# Patient Record
Sex: MALE | Race: WHITE
[De-identification: names, ages, dates, MRNs, and addresses within clinical notes are randomized per-mention and may not be internally consistent; named-entity substitution may affect disease eponyms.]

---

## 2017-08-17 ENCOUNTER — APPOINTMENT (OUTPATIENT)
Dept: OTOLARYNGOLOGY | Facility: CLINIC | Age: 32
End: 2017-08-17
Payer: COMMERCIAL

## 2017-08-17 VITALS — HEIGHT: 71 IN | BODY MASS INDEX: 29.12 KG/M2 | WEIGHT: 208 LBS

## 2017-08-17 DIAGNOSIS — Z86.2 PERSONAL HISTORY OF DISEASES OF THE BLOOD AND BLOOD-FORMING ORGANS AND CERTAIN DISORDERS INVOLVING THE IMMUNE MECHANISM: ICD-10-CM

## 2017-08-17 DIAGNOSIS — R04.0 EPISTAXIS: ICD-10-CM

## 2017-08-17 PROCEDURE — 99203 OFFICE O/P NEW LOW 30 MIN: CPT | Mod: 25

## 2017-08-17 PROCEDURE — 31231 NASAL ENDOSCOPY DX: CPT

## 2017-08-23 PROBLEM — R04.0 BLEEDING NOSE: Status: ACTIVE | Noted: 2017-08-17

## 2017-08-23 PROBLEM — Z86.2 HISTORY OF BLEEDING DISORDER: Status: RESOLVED | Noted: 2017-08-17 | Resolved: 2017-08-23

## 2017-08-23 RX ORDER — MUPIROCIN 2 G/100G
2 CREAM TOPICAL
Qty: 15 | Refills: 0 | Status: ACTIVE | COMMUNITY
Start: 2017-06-06

## 2017-08-23 RX ORDER — CLINDAMYCIN AND BENZOYL PEROXIDE 50; 10 MG/G; MG/G
1-5 GEL TOPICAL
Qty: 50 | Refills: 0 | Status: ACTIVE | COMMUNITY
Start: 2017-02-23

## 2017-08-23 RX ORDER — MONTELUKAST 10 MG/1
10 TABLET, FILM COATED ORAL
Refills: 0 | Status: ACTIVE | COMMUNITY

## 2017-08-23 RX ORDER — DOXYCYCLINE HYCLATE 100 MG/1
100 CAPSULE ORAL
Qty: 14 | Refills: 0 | Status: ACTIVE | COMMUNITY
Start: 2017-02-23

## 2017-08-24 ENCOUNTER — APPOINTMENT (OUTPATIENT)
Dept: OTOLARYNGOLOGY | Facility: CLINIC | Age: 32
End: 2017-08-24
Payer: COMMERCIAL

## 2017-08-24 DIAGNOSIS — L03.90 CELLULITIS, UNSPECIFIED: ICD-10-CM

## 2017-08-24 PROCEDURE — 99214 OFFICE O/P EST MOD 30 MIN: CPT | Mod: 25

## 2017-08-24 PROCEDURE — 30903 CONTROL OF NOSEBLEED: CPT

## 2017-08-24 RX ORDER — MUPIROCIN 20 MG/G
2 OINTMENT TOPICAL 3 TIMES DAILY
Qty: 1 | Refills: 4 | Status: ACTIVE | COMMUNITY
Start: 2017-08-24 | End: 1900-01-01

## 2017-09-08 ENCOUNTER — APPOINTMENT (OUTPATIENT)
Dept: OTOLARYNGOLOGY | Facility: CLINIC | Age: 32
End: 2017-09-08
Payer: COMMERCIAL

## 2017-09-08 VITALS — DIASTOLIC BLOOD PRESSURE: 82 MMHG | SYSTOLIC BLOOD PRESSURE: 128 MMHG | HEART RATE: 59 BPM | OXYGEN SATURATION: 98 %

## 2017-09-08 PROCEDURE — 99213 OFFICE O/P EST LOW 20 MIN: CPT

## 2018-01-25 ENCOUNTER — APPOINTMENT (OUTPATIENT)
Dept: OTOLARYNGOLOGY | Facility: CLINIC | Age: 33
End: 2018-01-25
Payer: COMMERCIAL

## 2018-01-25 VITALS
OXYGEN SATURATION: 96 % | DIASTOLIC BLOOD PRESSURE: 69 MMHG | SYSTOLIC BLOOD PRESSURE: 118 MMHG | HEART RATE: 51 BPM | TEMPERATURE: 97.6 F

## 2018-01-25 PROCEDURE — 30903 CONTROL OF NOSEBLEED: CPT | Mod: LT

## 2018-01-25 PROCEDURE — 99214 OFFICE O/P EST MOD 30 MIN: CPT | Mod: 25

## 2018-01-25 RX ORDER — SULFAMETHOXAZOLE AND TRIMETHOPRIM 800; 160 MG/1; MG/1
800-160 TABLET ORAL TWICE DAILY
Qty: 14 | Refills: 0 | Status: COMPLETED | COMMUNITY
Start: 2017-08-24 | End: 2018-01-25

## 2018-06-27 ENCOUNTER — APPOINTMENT (OUTPATIENT)
Dept: OTOLARYNGOLOGY | Facility: CLINIC | Age: 33
End: 2018-06-27
Payer: COMMERCIAL

## 2018-06-27 VITALS
HEART RATE: 58 BPM | OXYGEN SATURATION: 96 % | SYSTOLIC BLOOD PRESSURE: 118 MMHG | TEMPERATURE: 97.6 F | DIASTOLIC BLOOD PRESSURE: 83 MMHG

## 2018-06-27 PROCEDURE — 99213 OFFICE O/P EST LOW 20 MIN: CPT | Mod: 25

## 2018-06-27 PROCEDURE — 96372 THER/PROPH/DIAG INJ SC/IM: CPT

## 2019-05-20 ENCOUNTER — APPOINTMENT (OUTPATIENT)
Dept: OTOLARYNGOLOGY | Facility: CLINIC | Age: 34
End: 2019-05-20
Payer: COMMERCIAL

## 2019-05-20 VITALS
HEART RATE: 54 BPM | DIASTOLIC BLOOD PRESSURE: 76 MMHG | OXYGEN SATURATION: 96 % | TEMPERATURE: 98 F | SYSTOLIC BLOOD PRESSURE: 138 MMHG

## 2019-05-20 DIAGNOSIS — R04.0 EPISTAXIS: ICD-10-CM

## 2019-05-20 DIAGNOSIS — J30.2 OTHER SEASONAL ALLERGIC RHINITIS: ICD-10-CM

## 2019-05-20 PROCEDURE — 99213 OFFICE O/P EST LOW 20 MIN: CPT | Mod: 25

## 2019-05-20 RX ORDER — METHYLPRED ACET/NACL,ISO-OS/PF 40 MG/ML
40 VIAL (ML) INJECTION
Qty: 1 | Refills: 0 | Status: COMPLETED | OUTPATIENT
Start: 2019-05-20

## 2019-05-20 RX ADMIN — METHYLPREDNISOLONE ACETATE MG/ML: 40 INJECTION, SUSPENSION INTRA-ARTICULAR; INTRALESIONAL; INTRAMUSCULAR; SOFT TISSUE at 00:00

## 2019-05-20 NOTE — REVIEW OF SYSTEMS
[Patient Intake Form Reviewed] : Patient intake form was reviewed [As Noted in HPI] : as noted in HPI [Nasal Congestion] : nasal congestion [Negative] : Heme/Lymph [Nose Bleeds] : no nose bleeds

## 2019-05-20 NOTE — PROCEDURE
[Epistaxis] : evaluation of epistaxis [FreeTextEntry1] : Left Dilated nasal Blood vessels for laser ablation [Cauterized w/Silver Nitrate] : the bleeding was not cauterized with silver nitrate [FreeTextEntry2] : Laser Assisted Control of Epistaxis

## 2019-05-20 NOTE — REASON FOR VISIT
[Subsequent Evaluation] : a subsequent evaluation for [Epistaxis] : epistaxis [FreeTextEntry2] : status post  laser control of epistaxis.

## 2019-05-20 NOTE — HISTORY OF PRESENT ILLNESS
[de-identified] : 32 years old male patient with history of status post  laser control of epistaxis.  Patient is present today in the office with multiple  left dilated blood vessels.  No active bleeding.  Allergic Rhinitis

## 2020-03-12 ENCOUNTER — APPOINTMENT (OUTPATIENT)
Dept: OTOLARYNGOLOGY | Facility: CLINIC | Age: 35
End: 2020-03-12
Payer: COMMERCIAL

## 2020-03-12 VITALS
OXYGEN SATURATION: 95 % | TEMPERATURE: 98.5 F | HEART RATE: 68 BPM | DIASTOLIC BLOOD PRESSURE: 83 MMHG | SYSTOLIC BLOOD PRESSURE: 147 MMHG

## 2020-03-12 DIAGNOSIS — R42 DIZZINESS AND GIDDINESS: ICD-10-CM

## 2020-03-12 DIAGNOSIS — M26.609 UNSPECIFIED TEMPOROMANDIBULAR JOINT DISORDER: ICD-10-CM

## 2020-03-12 PROCEDURE — 31231 NASAL ENDOSCOPY DX: CPT

## 2020-03-12 PROCEDURE — 99213 OFFICE O/P EST LOW 20 MIN: CPT | Mod: 25

## 2020-03-12 NOTE — PROCEDURE
[Cauterized w/Silver Nitrate] : the bleeding was not cauterized with silver nitrate [FreeTextEntry6] : ok [FreeTextEntry2] : Laser Assisted Control of Epistaxis

## 2020-03-12 NOTE — REASON FOR VISIT
[Subsequent Evaluation] : a subsequent evaluation for [Epistaxis] : epistaxis [FreeTextEntry2] : status post  laser control of epistaxis.   Allergic Rhinitis, Sinus Pressure, Dizziness & Nausea for one week

## 2020-03-12 NOTE — REVIEW OF SYSTEMS
[Patient Intake Form Reviewed] : Patient intake form was reviewed [Dizziness] : dizziness [Nasal Congestion] : nasal congestion [As Noted in HPI] : as noted in HPI [Negative] : Heme/Lymph [Nose Bleeds] : no nose bleeds [FreeTextEntry7] : Nausea

## 2020-03-12 NOTE — HISTORY OF PRESENT ILLNESS
[de-identified] : 32 years old male patient with history Allergic Rhinitis, Sinus Pressure, Dizziness & Nausea for one week.   Patient is present today in the office with Allergic Rhinitis. Bilateral   TMJ

## 2021-02-10 ENCOUNTER — NON-APPOINTMENT (OUTPATIENT)
Age: 36
End: 2021-02-10

## 2021-02-17 ENCOUNTER — APPOINTMENT (OUTPATIENT)
Dept: OTOLARYNGOLOGY | Facility: CLINIC | Age: 36
End: 2021-02-17
Payer: COMMERCIAL

## 2021-02-17 VITALS
HEART RATE: 57 BPM | TEMPERATURE: 97.6 F | HEIGHT: 71 IN | OXYGEN SATURATION: 97 % | WEIGHT: 215 LBS | BODY MASS INDEX: 30.1 KG/M2 | DIASTOLIC BLOOD PRESSURE: 82 MMHG | SYSTOLIC BLOOD PRESSURE: 126 MMHG

## 2021-02-17 PROCEDURE — 99072 ADDL SUPL MATRL&STAF TM PHE: CPT

## 2021-02-17 PROCEDURE — 31231 NASAL ENDOSCOPY DX: CPT

## 2021-02-17 PROCEDURE — 99212 OFFICE O/P EST SF 10 MIN: CPT | Mod: 25

## 2021-02-17 NOTE — HISTORY OF PRESENT ILLNESS
[de-identified] : 35 years old male patient with  Left Recurrent Epistaxis for the past month   Patient is present today in the office with No active Epistaxis.   Bacitracin was inserted into patient left nostril for lubrication.

## 2021-02-17 NOTE — REASON FOR VISIT
[Subsequent Evaluation] : a subsequent evaluation for [Epistaxis] : epistaxis [FreeTextEntry2] : status post  laser control of epistaxis.   Recurrent Epistaxis for the past month

## 2021-02-17 NOTE — PROCEDURE
[Image(s) Captured] : image(s) captured and filed [Topical Lidocaine] : topical lidocaine [Epistaxis] : evaluation of epistaxis [Rigid Endoscope] : examined with a rigid endoscope [Serial Number: ___] : Serial Number: [unfilled] [Nasal Septum Mucosa Bleeding] : no bleeding [Cauterized w/Silver Nitrate] : the bleeding was not cauterized with silver nitrate [FreeTextEntry6] : left epistaxis site not visible

## 2022-05-02 ENCOUNTER — NON-APPOINTMENT (OUTPATIENT)
Age: 37
End: 2022-05-02

## 2022-05-04 ENCOUNTER — APPOINTMENT (OUTPATIENT)
Dept: OTOLARYNGOLOGY | Facility: CLINIC | Age: 37
End: 2022-05-04
Payer: COMMERCIAL

## 2022-05-04 VITALS
WEIGHT: 220 LBS | DIASTOLIC BLOOD PRESSURE: 85 MMHG | RESPIRATION RATE: 17 BRPM | HEIGHT: 71 IN | BODY MASS INDEX: 30.8 KG/M2 | HEART RATE: 74 BPM | OXYGEN SATURATION: 99 % | TEMPERATURE: 97.9 F | SYSTOLIC BLOOD PRESSURE: 138 MMHG

## 2022-05-04 DIAGNOSIS — R04.0 EPISTAXIS: ICD-10-CM

## 2022-05-04 DIAGNOSIS — J34.2 DEVIATED NASAL SEPTUM: ICD-10-CM

## 2022-05-04 PROCEDURE — 30903 CONTROL OF NOSEBLEED: CPT | Mod: LT

## 2022-05-04 PROCEDURE — 99214 OFFICE O/P EST MOD 30 MIN: CPT | Mod: 25

## 2022-05-04 NOTE — PROCEDURE
[Image(s) Captured] : image(s) captured and filed [Epistaxis] : evaluation of epistaxis [Topical Lidocaine] : topical lidocaine [Oxymetazoline HCl] : oxymetazoline HCl [Rigid Endoscope] : examined with a rigid endoscope [Serial Number: ___] : Serial Number: [unfilled] [Nasal Septum Mucosa Bleeding] : no bleeding [Congested] : congested [Allergic] : allergic signs [Cauterized w/Silver Nitrate] : the bleeding was cauterized with silver nitrate [FreeTextEntry1] : Left side Laser Assisted Control of epistaxis ( Diode Laser ) [FreeTextEntry3] : Left side Laser Assisted Control of epistaxis ( Diode Laser ) [FreeTextEntry6] : left epistaxis site not visible [FreeTextEntry2] : Left Laser Assisted Control of Epistaxis ( Diode Laser )

## 2022-05-04 NOTE — REVIEW OF SYSTEMS
[Patient Intake Form Reviewed] : Patient intake form was reviewed [Nasal Congestion] : nasal congestion [Nose Bleeds] : nose bleeds [As Noted in HPI] : as noted in HPI [Negative] : Heme/Lymph [Dizziness] : no dizziness [FreeTextEntry7] : Nausea

## 2022-05-04 NOTE — HISTORY OF PRESENT ILLNESS
[de-identified] : 36 years old male patient with  Left Recurrent Epistaxis for the past month   Patient is present today in the office with No active left side Epistaxis. Left side dilated nasal blood vessels.  Bacitracin was inserted into patient left nostril for lubrication.  Depo -Medrol  40 mg/ml 0.5 cc was administer to left deltoid.  NDC 687766047-70,   EXP.  03/2023, Lot# [id number]

## 2022-05-04 NOTE — REASON FOR VISIT
[Subsequent Evaluation] : a subsequent evaluation for [Epistaxis] : epistaxis [FreeTextEntry2] : status post  laser control of epistaxis.   Recurrent  Left side Epistaxis for the past month

## 2022-05-10 NOTE — HISTORY OF PRESENT ILLNESS
[de-identified] : 36 years old male patient with  Left Recurrent Epistaxis for the past month   Patient is present today in the office with No active left side Epistaxis.   Bacitracin was inserted into patient left nostril for lubrication.

## 2022-05-10 NOTE — PROCEDURE
[Image(s) Captured] : image(s) captured and filed [Epistaxis] : evaluation of epistaxis [Topical Lidocaine] : topical lidocaine [Oxymetazoline HCl] : oxymetazoline HCl [Rigid Endoscope] : examined with a rigid endoscope [Serial Number: ___] : Serial Number: [unfilled] [Nasal Septum Mucosa Bleeding] : no bleeding [Cauterized w/Silver Nitrate] : the bleeding was not cauterized with silver nitrate [FreeTextEntry6] : left epistaxis site not visible

## 2023-01-10 ENCOUNTER — APPOINTMENT (OUTPATIENT)
Dept: INTERNAL MEDICINE | Facility: CLINIC | Age: 38
End: 2023-01-10
Payer: COMMERCIAL

## 2023-01-10 PROCEDURE — 99395 PREV VISIT EST AGE 18-39: CPT

## 2023-01-10 PROCEDURE — 36415 COLL VENOUS BLD VENIPUNCTURE: CPT

## 2023-03-31 ENCOUNTER — APPOINTMENT (OUTPATIENT)
Dept: INTERNAL MEDICINE | Facility: CLINIC | Age: 38
End: 2023-03-31
Payer: COMMERCIAL

## 2023-03-31 PROCEDURE — 99213 OFFICE O/P EST LOW 20 MIN: CPT

## 2023-04-10 ENCOUNTER — APPOINTMENT (OUTPATIENT)
Dept: OTOLARYNGOLOGY | Facility: CLINIC | Age: 38
End: 2023-04-10
Payer: COMMERCIAL

## 2023-04-10 VITALS
HEIGHT: 71 IN | RESPIRATION RATE: 16 BRPM | OXYGEN SATURATION: 98 % | WEIGHT: 205 LBS | SYSTOLIC BLOOD PRESSURE: 132 MMHG | BODY MASS INDEX: 28.7 KG/M2 | DIASTOLIC BLOOD PRESSURE: 91 MMHG | HEART RATE: 60 BPM

## 2023-04-10 PROCEDURE — 99213 OFFICE O/P EST LOW 20 MIN: CPT

## 2023-05-08 ENCOUNTER — APPOINTMENT (OUTPATIENT)
Dept: OTOLARYNGOLOGY | Facility: CLINIC | Age: 38
End: 2023-05-08
Payer: COMMERCIAL

## 2023-05-08 VITALS
OXYGEN SATURATION: 98 % | HEIGHT: 71 IN | RESPIRATION RATE: 16 BRPM | BODY MASS INDEX: 28.7 KG/M2 | WEIGHT: 205 LBS | DIASTOLIC BLOOD PRESSURE: 88 MMHG | SYSTOLIC BLOOD PRESSURE: 120 MMHG | HEART RATE: 68 BPM

## 2023-05-08 DIAGNOSIS — H57.89 OTHER SPECIFIED DISORDERS OF EYE AND ADNEXA: ICD-10-CM

## 2023-05-08 DIAGNOSIS — Z87.09 PERSONAL HISTORY OF OTHER DISEASES OF THE RESPIRATORY SYSTEM: ICD-10-CM

## 2023-05-08 DIAGNOSIS — J34.3 HYPERTROPHY OF NASAL TURBINATES: ICD-10-CM

## 2023-05-08 DIAGNOSIS — H10.9 UNSPECIFIED CONJUNCTIVITIS: ICD-10-CM

## 2023-05-08 DIAGNOSIS — J30.2 OTHER SEASONAL ALLERGIC RHINITIS: ICD-10-CM

## 2023-05-08 DIAGNOSIS — J30.9 ALLERGIC RHINITIS, UNSPECIFIED: ICD-10-CM

## 2023-05-08 PROCEDURE — 99213 OFFICE O/P EST LOW 20 MIN: CPT

## 2023-05-08 RX ORDER — TRIFLURIDINE 10 MG/ML
1 SOLUTION OPHTHALMIC
Qty: 1 | Refills: 3 | Status: ACTIVE | COMMUNITY
Start: 2023-05-08 | End: 1900-01-01

## 2023-05-09 NOTE — PROCEDURE
[Serial Number: ___] : Serial Number: [unfilled] [Congested] : congested [Allergic] : allergic signs [Nasal Septum Mucosa Bleeding] : no bleeding [Red] : red [Cauterized w/Silver Nitrate] : the bleeding was not cauterized with silver nitrate

## 2023-05-09 NOTE — HISTORY OF PRESENT ILLNESS
[de-identified] : 37 years old male patient with  Left Recurrent Epistaxis for the past month   Patient is present today in the office with  Bilateral red eyes.  Seasonal allergies for the past couple of days.  Right eye conjunctivitis.  Allergic Rhinitis, Turbinate Hypertrophy.  Depo -Medrol  40 mg/ml 0.5 cc was administer to left deltoid.  NDC 517825116-83,   EXP.  03/2025, Lot# [id number]

## 2023-05-09 NOTE — REASON FOR VISIT
[Subsequent Evaluation] : a subsequent evaluation for [Epistaxis] : epistaxis [FreeTextEntry2] : status post  laser control of epistaxis  Left side Epistaxis.   Nasal congestion.  Bilateral red eyes.  Seasonal allergies for the past couple of days,

## 2023-07-25 RX ORDER — ROSUVASTATIN CALCIUM 5 MG/1
5 TABLET, FILM COATED ORAL
Qty: 90 | Refills: 1 | Status: ACTIVE | COMMUNITY
Start: 2023-07-13 | End: 1900-01-01

## 2024-04-29 ENCOUNTER — APPOINTMENT (OUTPATIENT)
Dept: OTOLARYNGOLOGY | Facility: CLINIC | Age: 39
End: 2024-04-29
Payer: COMMERCIAL

## 2024-04-29 VITALS
BODY MASS INDEX: 29.4 KG/M2 | WEIGHT: 210 LBS | HEIGHT: 71 IN | DIASTOLIC BLOOD PRESSURE: 87 MMHG | SYSTOLIC BLOOD PRESSURE: 138 MMHG | OXYGEN SATURATION: 98 % | HEART RATE: 58 BPM | RESPIRATION RATE: 16 BRPM

## 2024-04-29 PROCEDURE — 96372 THER/PROPH/DIAG INJ SC/IM: CPT

## 2024-04-29 PROCEDURE — 99213 OFFICE O/P EST LOW 20 MIN: CPT | Mod: 25

## 2024-04-29 NOTE — REASON FOR VISIT
[Subsequent Evaluation] : a subsequent evaluation for [FreeTextEntry2] : Severe Allergic Rhinitis  Depomedrol 1cc IM

## 2025-05-03 ENCOUNTER — NON-APPOINTMENT (OUTPATIENT)
Age: 40
End: 2025-05-03

## 2025-05-05 ENCOUNTER — APPOINTMENT (OUTPATIENT)
Dept: OTOLARYNGOLOGY | Facility: CLINIC | Age: 40
End: 2025-05-05
Payer: COMMERCIAL

## 2025-05-05 VITALS
OXYGEN SATURATION: 100 % | BODY MASS INDEX: 29.4 KG/M2 | SYSTOLIC BLOOD PRESSURE: 120 MMHG | RESPIRATION RATE: 16 BRPM | HEART RATE: 56 BPM | HEIGHT: 71 IN | DIASTOLIC BLOOD PRESSURE: 74 MMHG | WEIGHT: 210 LBS

## 2025-05-05 PROCEDURE — 99213 OFFICE O/P EST LOW 20 MIN: CPT
